# Patient Record
Sex: FEMALE | Race: WHITE | NOT HISPANIC OR LATINO | URBAN - METROPOLITAN AREA
[De-identification: names, ages, dates, MRNs, and addresses within clinical notes are randomized per-mention and may not be internally consistent; named-entity substitution may affect disease eponyms.]

---

## 2025-01-18 ENCOUNTER — TELEPHONE (OUTPATIENT)
Dept: PRIMARY CARE | Facility: CLINIC | Age: 35
End: 2025-01-18
Payer: COMMERCIAL

## 2025-01-18 DIAGNOSIS — R11.0 NAUSEA: Primary | ICD-10-CM

## 2025-01-20 RX ORDER — ONDANSETRON 8 MG/1
8 TABLET, ORALLY DISINTEGRATING ORAL EVERY 8 HOURS PRN
Qty: 30 TABLET | Refills: 0 | Status: SHIPPED | OUTPATIENT
Start: 2025-01-20 | End: 2025-02-19

## 2025-06-24 ENCOUNTER — APPOINTMENT (OUTPATIENT)
Dept: DERMATOLOGY | Facility: CLINIC | Age: 35
End: 2025-06-24
Payer: COMMERCIAL

## 2025-06-24 DIAGNOSIS — Z80.8 FAMILY HISTORY OF SKIN CANCER: ICD-10-CM

## 2025-06-24 DIAGNOSIS — Z12.83 SCREENING EXAM FOR SKIN CANCER: ICD-10-CM

## 2025-06-24 DIAGNOSIS — R59.9 PALPABLE LYMPH NODE: ICD-10-CM

## 2025-06-24 DIAGNOSIS — D22.9 MULTIPLE BENIGN NEVI: Primary | ICD-10-CM

## 2025-06-24 PROCEDURE — 99213 OFFICE O/P EST LOW 20 MIN: CPT | Performed by: DERMATOLOGY

## 2025-06-24 PROCEDURE — 1036F TOBACCO NON-USER: CPT | Performed by: DERMATOLOGY

## 2025-06-24 ASSESSMENT — DERMATOLOGY QUALITY OF LIFE (QOL) ASSESSMENT
ARE THERE EXCLUSIONS OR EXCEPTIONS FOR THE QUALITY OF LIFE ASSESSMENT: NO
DATE THE QUALITY-OF-LIFE ASSESSMENT WAS COMPLETED: 67380
RATE HOW BOTHERED YOU ARE BY SYMPTOMS OF YOUR SKIN PROBLEM (EG, ITCHING, STINGING BURNING, HURTING OR SKIN IRRITATION): 0 - NEVER BOTHERED
RATE HOW BOTHERED YOU ARE BY EFFECTS OF YOUR SKIN PROBLEMS ON YOUR ACTIVITIES (EG, GOING OUT, ACCOMPLISHING WHAT YOU WANT, WORK ACTIVITIES OR YOUR RELATIONSHIPS WITH OTHERS): 0 - NEVER BOTHERED
RATE HOW EMOTIONALLY BOTHERED YOU ARE BY YOUR SKIN PROBLEM (FOR EXAMPLE, WORRY, EMBARRASSMENT, FRUSTRATION): 0 - NEVER BOTHERED

## 2025-06-24 ASSESSMENT — DERMATOLOGY PATIENT ASSESSMENT
DO YOU USE SUNSCREEN: DAILY
ARE YOU AN ORGAN TRANSPLANT RECIPIENT: NO
ARE YOU ON BIRTH CONTROL: NO
DO YOU USE A TANNING BED: NO
HAVE YOU HAD OR DO YOU HAVE A STAPH INFECTION: NO
DO YOU HAVE IRREGULAR MENSTRUAL CYCLES: NO
HAVE YOU HAD OR DO YOU HAVE VASCULAR DISEASE: NO
ARE YOU TRYING TO GET PREGNANT: NO
DO YOU HAVE ANY NEW OR CHANGING LESIONS: NO

## 2025-06-24 ASSESSMENT — PATIENT GLOBAL ASSESSMENT (PGA): PATIENT GLOBAL ASSESSMENT: PATIENT GLOBAL ASSESSMENT:  1 - CLEAR

## 2025-06-24 ASSESSMENT — ITCH NUMERIC RATING SCALE: HOW SEVERE IS YOUR ITCHING?: 0

## 2025-06-24 NOTE — PROGRESS NOTES
Subjective     Johanna Fernandez is a 35 y.o. female who presents for the following: Skin Check (Posterior scalp). Last derm visit 6/2023 for Full Skin Exam. FH of melanoma    Intake Questions  Do you have any new or changing Lesions?: No  Are you an organ transplant recipient?: No  Have you had or do you have a Staph Infection?: No  Have you had or do you have Vacular Disease?: No  Do you use sunscreen?: Daily  Do you use a tanning bed?: No  Are you trying to get pregnant?: No  Are you on birth control?: No  Do you have irregular menstrual cycles?: No    Review of Systems:  No other skin or systemic complaints other than what is documented elsewhere in the note.    The following portions of the chart were reviewed this encounter and updated as appropriate:         Skin Cancer History  Biopsy Log Book  No skin cancers from Specimen Tracking.    Additional History      Specialty Problems    None       Objective   Well appearing patient in no apparent distress; mood and affect are within normal limits.    A full examination was performed including scalp, head, eyes, ears, nose, lips, neck, chest, axillae, abdomen, back, buttocks, bilateral upper extremities, bilateral lower extremities, hands, feet, fingers, toes, fingernails, and toenails. All findings within normal limits unless otherwise noted below.    Assessment/Plan   Skin Exam  1. MULTIPLE BENIGN NEVI  Generalized  Brown and tan macules and papules with reassuring findings on dermoscopy  -These lesions have benign, reassuring patterns on dermoscopy  -Recommend continued self observation, and to contact the office if any changes in nevi are noticed  2. FAMILY HISTORY OF SKIN CANCER  Generalized  FH melanoma - cousin, cause of death  3. SCREENING EXAM FOR SKIN CANCER  Generalized  Full body skin exam  -No lesions concerning for malignancy on the remainder the skin exam today other than what may be separately documented  - The ugly duckling sign was discussed.  Monitor for any skin lesions that are different in color, shape, or size than others on body  -Sun protection was discussed. Recommend SPF 30+, hats with brims, sun protective clothing, and avoiding sun exposure between 10 AM and 2 PM whenever possible  -Recommend regular skin exams or sooner if new or changing lesions     Related Procedures  Follow Up In Dermatology - Established Patient  4. PALPABLE LYMPH NODE  Right Occipital Scalp  Palpable lymph node on right occipital scalp, it is <1 cm, mobile, very subtle. She has other lymph nodes in same chain and on left side. This one is slightly larger  - present 1 year, fairly stable, no others  - favor started as reactive lymph node and stayed mildly enlarged  - does not feel to be malignant and given 1 year stable less likely as well    - discussed ultrasound, agreed to monitor, if it increases in size or more appear contact for ultrasound    Follow up 1-2 years Full Skin Exam

## 2025-06-24 NOTE — Clinical Note
Palpable lymph node on right occipital scalp, it is <1 cm, mobile, very subtle. She has other lymph nodes in same chain and on left side. This one is slightly larger

## 2025-06-24 NOTE — Clinical Note
- present 1 year, fairly stable, no others  - favor started as reactive lymph node and stayed mildly enlarged  - does not feel to be malignant and given 1 year stable less likely as well    - discussed ultrasound, agreed to monitor, if it increases in size or more appear contact for ultrasound

## 2025-08-07 ENCOUNTER — APPOINTMENT (OUTPATIENT)
Dept: DERMATOLOGY | Facility: CLINIC | Age: 35
End: 2025-08-07
Payer: COMMERCIAL

## 2026-06-24 ENCOUNTER — APPOINTMENT (OUTPATIENT)
Dept: DERMATOLOGY | Facility: CLINIC | Age: 36
End: 2026-06-24
Payer: COMMERCIAL